# Patient Record
Sex: MALE | Race: WHITE | Employment: FULL TIME | URBAN - METROPOLITAN AREA
[De-identification: names, ages, dates, MRNs, and addresses within clinical notes are randomized per-mention and may not be internally consistent; named-entity substitution may affect disease eponyms.]

---

## 2017-01-19 ENCOUNTER — HOSPITAL ENCOUNTER (EMERGENCY)
Age: 32
Discharge: STILL ADMITTED OR ADMINISTRATIVE | End: 2017-01-19
Attending: FAMILY MEDICINE

## 2017-01-19 DIAGNOSIS — Z53.21 PATIENT LEFT WITHOUT BEING SEEN: Primary | ICD-10-CM

## 2017-01-20 NOTE — [facility] PROVIDER NOTE
HPI Comments: Patient not seen       Past Medical History   Diagnosis Date    Hypothyroid     Other ill-defined conditions(799.89)      chronic back pain, thyroid trouble        Past Surgical History   Procedure Laterality Date    Hx colonoscopy       blood in stool. 2014    Hx orthopaedic       rt knee meniscus repair         Family History   Problem Relation Age of Onset    Arthritis-osteo Mother     Hypertension Mother     Psychiatric Disorder Father      schitzophrenia    Cancer Paternal Grandmother      cancer    Diabetes Paternal Grandfather     Hypertension Paternal Grandfather         Social History     Social History    Marital status: SINGLE     Spouse name: N/A    Number of children: N/A    Years of education: N/A     Occupational History    Not on file. Social History Main Topics    Smoking status: Current Every Day Smoker     Packs/day: 2.00    Smokeless tobacco: Never Used    Alcohol use No      Comment: very very rarely.  Drug use: No    Sexual activity: Yes     Partners: Female     Other Topics Concern    Not on file     Social History Narrative                ALLERGIES: Tramadol and Flexeril [cyclobenzaprine]    Review of Systems    There were no vitals filed for this visit.     Physical Exam    MDM    Procedures

## 2017-07-06 ENCOUNTER — ED HISTORICAL/CONVERTED ENCOUNTER (OUTPATIENT)
Dept: OTHER | Age: 32
End: 2017-07-06

## 2019-08-14 ENCOUNTER — HOSPITAL ENCOUNTER (EMERGENCY)
Age: 34
Discharge: LEFT BEFORE DISCHARGE | End: 2019-08-14
Payer: COMMERCIAL

## 2019-08-14 VITALS — HEART RATE: 82 BPM | OXYGEN SATURATION: 94 %

## 2019-12-15 DIAGNOSIS — M25.561 ACUTE PAIN OF RIGHT KNEE: Primary | ICD-10-CM

## 2020-10-13 PROBLEM — E78.00 PURE HYPERCHOLESTEROLEMIA: Status: ACTIVE | Noted: 2020-10-13

## 2023-05-06 LAB
FLU A RESULT: NOT DETECTED
FLU B RESULT: NOT DETECTED
SARS-COV-2 RESULT: NOT DETECTED